# Patient Record
Sex: FEMALE | Race: WHITE | NOT HISPANIC OR LATINO | URBAN - METROPOLITAN AREA
[De-identification: names, ages, dates, MRNs, and addresses within clinical notes are randomized per-mention and may not be internally consistent; named-entity substitution may affect disease eponyms.]

---

## 2023-06-14 ENCOUNTER — OFFICE (OUTPATIENT)
Dept: URBAN - METROPOLITAN AREA CLINIC 19 | Facility: CLINIC | Age: 33
End: 2023-06-14

## 2023-06-14 VITALS
WEIGHT: 197 LBS | SYSTOLIC BLOOD PRESSURE: 166 MMHG | DIASTOLIC BLOOD PRESSURE: 97 MMHG | HEART RATE: 104 BPM | OXYGEN SATURATION: 100 % | HEIGHT: 61 IN

## 2023-06-14 DIAGNOSIS — R19.5 OTHER FECAL ABNORMALITIES: ICD-10-CM

## 2023-06-14 DIAGNOSIS — M79.7 FIBROMYALGIA: ICD-10-CM

## 2023-06-14 DIAGNOSIS — R14.0 ABDOMINAL DISTENSION (GASEOUS): ICD-10-CM

## 2023-06-14 DIAGNOSIS — N39.0 URINARY TRACT INFECTION, SITE NOT SPECIFIED: ICD-10-CM

## 2023-06-14 DIAGNOSIS — R70.0 ELEVATED ERYTHROCYTE SEDIMENTATION RATE: ICD-10-CM

## 2023-06-14 DIAGNOSIS — F41.9 ANXIETY DISORDER, UNSPECIFIED: ICD-10-CM

## 2023-06-14 DIAGNOSIS — F90.9 ATTENTION-DEFICIT HYPERACTIVITY DISORDER, UNSPECIFIED TYPE: ICD-10-CM

## 2023-06-14 DIAGNOSIS — D72.829 ELEVATED WHITE BLOOD CELL COUNT, UNSPECIFIED: ICD-10-CM

## 2023-06-14 DIAGNOSIS — E16.1 OTHER HYPOGLYCEMIA: ICD-10-CM

## 2023-06-14 DIAGNOSIS — R10.13 EPIGASTRIC PAIN: ICD-10-CM

## 2023-06-14 PROCEDURE — 99204 OFFICE O/P NEW MOD 45 MIN: CPT | Performed by: NURSE PRACTITIONER

## 2023-06-14 RX ORDER — PANTOPRAZOLE 40 MG/1
40 TABLET, DELAYED RELEASE ORAL
Qty: 30 | Refills: 11 | Status: ACTIVE
Start: 2023-06-14

## 2023-06-14 NOTE — SERVICEHPINOTES
Ms. Mendoza is a 32-year-old female with PMH including ADHD, anxiety, FM, right hip bursitis, GERD not on PPI, hyperinsulinemia on metformin since childhood, HTN in pregnancy.
br
br She presents to clinic today for hospital follow-up, c/o AP with bloating.
br
br Onset of AP was last week. Located in epigastric region, describes as intense pressure. No radiation to back or shoulders.
br She had pain similar to this 1.5 months ago and went to Urgent Care. She had an AUS which was negative except fatty infiltration to liver ([facility]).  States she went to the ER last week for the AP- [facility]. She had a negative CT abdomen with contrast and labs. I do not have these records. She was diagnosed with UTI, elevated WBC and placed on Macrobid and famotidine. br
br She has bloating, new onset constipation now relieved, chronic loose stool (multi-year), "lumps" in fatty tissue throughout abdomen, heartburn daily.
br
brNo melena, hematochezia, fever, chills, vomiting, nausea, anorexia, early satiety.
br Previous NSAID use, now discontinued. No BC's/Goody's. No travel.
br
br No h/o gallbladder, pancreas disease, PUD. 
br She would like a referral to a different rheumatologist, had an elevated ESR, diagnosed with FM.  No well water at home.

## 2023-06-14 NOTE — SERVICENOTES
Will f/u with her in 6-8 weeks, reassess symptoms at that time. May need EGD. Did not order h. pylori because she is on famotidine and antibiotic. CT abdomen and US both negative. Possibly all related to UTI. Sent to Dr. Stevens for review.

## 2023-07-24 ENCOUNTER — OFFICE (OUTPATIENT)
Dept: URBAN - METROPOLITAN AREA CLINIC 19 | Facility: CLINIC | Age: 33
End: 2023-07-24

## 2023-07-24 VITALS
HEART RATE: 108 BPM | HEIGHT: 61 IN | OXYGEN SATURATION: 100 % | SYSTOLIC BLOOD PRESSURE: 145 MMHG | DIASTOLIC BLOOD PRESSURE: 102 MMHG | WEIGHT: 202 LBS

## 2023-07-24 DIAGNOSIS — R10.13 EPIGASTRIC PAIN: ICD-10-CM

## 2023-07-24 DIAGNOSIS — R19.5 OTHER FECAL ABNORMALITIES: ICD-10-CM

## 2023-07-24 DIAGNOSIS — E16.1 OTHER HYPOGLYCEMIA: ICD-10-CM

## 2023-07-24 DIAGNOSIS — R12 HEARTBURN: ICD-10-CM

## 2023-07-24 DIAGNOSIS — F90.9 ATTENTION-DEFICIT HYPERACTIVITY DISORDER, UNSPECIFIED TYPE: ICD-10-CM

## 2023-07-24 DIAGNOSIS — F41.9 ANXIETY DISORDER, UNSPECIFIED: ICD-10-CM

## 2023-07-24 PROCEDURE — 99213 OFFICE O/P EST LOW 20 MIN: CPT | Performed by: NURSE PRACTITIONER

## 2023-07-24 RX ORDER — PANTOPRAZOLE 40 MG/1
40 TABLET, DELAYED RELEASE ORAL
Qty: 30 | Refills: 11 | Status: ACTIVE
Start: 2023-06-14

## 2023-07-24 NOTE — SERVICEHPINOTES
Ms. Mendoza is a 32-year-old female with PMH including ADHD, anxiety, FM, right hip bursitis, GERD not on PPI, hyperinsulinemia on metformin since childhood, HTN in pregnancy.She presents to clinic today for hospital follow-up, c/o AP with bloating.Onset of AP was last week. Located in epigastric region, describes as intense pressure. No radiation to back or shoulders.brShe had pain similar to this 1.5 months ago and went to Urgent Care. She had an AUS which was negative except fatty infiltration to liver ([facility]).  States she went to the ER last week for the AP- [facility]. She had a negative CT abdomen with contrast and labs. I do not have these records. She was diagnosed with UTI, elevated WBC and placed on Macrobid and famotidine. She has bloating, new onset constipation now relieved, chronic loose stool (multi-year), "lumps" in fatty tissue throughout abdomen, heartburn daily.No melena, hematochezia, fever, chills, vomiting, nausea, anorexia, early satiety.brPrevious NSAID use, now discontinued. No BC's/Goody's. No travel.No h/o gallbladder, pancreas disease, PUD. brShe would like a referral to a different rheumatologist, had an elevated ESR, diagnosed with FM.  No well water at home. 
br
muriel   UPDATE 7/24/23:
br
br Ms. Mendoza presents to clinic today for follow-up. She is feeling much better. No further epigastric AP/bloating. She took abx in June for her UTI but was unable to finish d/t vaginal yeast infection.
br
br No melena, hematochezia, N/V, constipation, anorexia, early satiety, urinary symptoms. 
br
br She has chronic loose stool (4-5/day) in the setting of Metformin. She did not complete the pancreatic elastase or lipase. She is scheduled for SIBO breath test. 
muriel Weir did send the referral for new rheumatologist but she decided not to pursue at this time. 
br She did recently smoke more than her baseline d/t being at the [employer] for her [place]. She does not monitor BP at home but states anxiety likely contributes to elevation today. 
br 
muriel
She remains on PPI once daily. She stopped the famotidine.